# Patient Record
Sex: MALE | Race: BLACK OR AFRICAN AMERICAN | Employment: FULL TIME | URBAN - METROPOLITAN AREA
[De-identification: names, ages, dates, MRNs, and addresses within clinical notes are randomized per-mention and may not be internally consistent; named-entity substitution may affect disease eponyms.]

---

## 2017-09-19 ENCOUNTER — HOSPITAL ENCOUNTER (OUTPATIENT)
Dept: MRI IMAGING | Age: 62
Discharge: HOME | End: 2017-09-19

## 2017-09-19 DIAGNOSIS — I11.9 MALIGNANT HYPERTENSIVE HEART DISEASE WITHOUT HEART FAILURE: ICD-10-CM

## 2017-09-19 NOTE — PROGRESS NOTES
Patient cancelled study. Arms are  raised above head due to size of MRI at 60 cm combined with body habitus,this caused his arms a lot of pain and became quickly unbearable for him. Moving forward . Wilton Rebollar will discuss his options for Cardiac MRI on 70 cm 1.5T 450 wide bore.